# Patient Record
Sex: FEMALE | URBAN - METROPOLITAN AREA
[De-identification: names, ages, dates, MRNs, and addresses within clinical notes are randomized per-mention and may not be internally consistent; named-entity substitution may affect disease eponyms.]

---

## 2022-10-25 ENCOUNTER — EMPLOYEE HEALTH (OUTPATIENT)
Dept: OTHER | Facility: HOSPITAL | Age: 27
End: 2022-10-25
Attending: PREVENTIVE MEDICINE

## 2022-10-25 DIAGNOSIS — Z11.1 SCREENING-PULMONARY TB: Primary | ICD-10-CM

## 2022-10-25 PROCEDURE — 86480 TB TEST CELL IMMUN MEASURE: CPT

## 2022-10-27 LAB
M TB IFN-G CD4+ T-CELLS BLD-ACNC: 0.01 IU/ML
M TB TUBERC IFN-G BLD QL: NEGATIVE
M TB TUBERC IGNF/MITOGEN IGNF CONTROL: >10 IU/ML
QFT TB1 AG MINUS NIL: 0 IU/ML
QFT TB2 AG MINUS NIL: 0 IU/ML

## 2023-02-17 ENCOUNTER — OFFICE VISIT (OUTPATIENT)
Dept: FAMILY MEDICINE CLINIC | Facility: CLINIC | Age: 28
End: 2023-02-17
Payer: COMMERCIAL

## 2023-02-17 VITALS
SYSTOLIC BLOOD PRESSURE: 112 MMHG | DIASTOLIC BLOOD PRESSURE: 76 MMHG | RESPIRATION RATE: 16 BRPM | WEIGHT: 179.63 LBS | HEART RATE: 62 BPM | BODY MASS INDEX: 26.61 KG/M2 | TEMPERATURE: 99 F | HEIGHT: 69 IN | OXYGEN SATURATION: 99 %

## 2023-02-17 DIAGNOSIS — J06.9 UPPER RESPIRATORY INFECTION, ACUTE: Primary | ICD-10-CM

## 2023-02-17 DIAGNOSIS — J01.90 ACUTE RHINOSINUSITIS: ICD-10-CM

## 2023-02-17 LAB
OPERATOR ID: NORMAL
POCT LOT NUMBER: NORMAL
RAPID SARS-COV-2 BY PCR: NOT DETECTED

## 2023-02-17 PROCEDURE — 3078F DIAST BP <80 MM HG: CPT | Performed by: PHYSICIAN ASSISTANT

## 2023-02-17 PROCEDURE — U0002 COVID-19 LAB TEST NON-CDC: HCPCS | Performed by: PHYSICIAN ASSISTANT

## 2023-02-17 PROCEDURE — 99202 OFFICE O/P NEW SF 15 MIN: CPT | Performed by: PHYSICIAN ASSISTANT

## 2023-02-17 PROCEDURE — 3074F SYST BP LT 130 MM HG: CPT | Performed by: PHYSICIAN ASSISTANT

## 2023-02-17 PROCEDURE — 3008F BODY MASS INDEX DOCD: CPT | Performed by: PHYSICIAN ASSISTANT

## 2023-02-17 RX ORDER — AMOXICILLIN AND CLAVULANATE POTASSIUM 875; 125 MG/1; MG/1
1 TABLET, FILM COATED ORAL 2 TIMES DAILY
Qty: 10 TABLET | Refills: 0 | Status: SHIPPED | OUTPATIENT
Start: 2023-02-17 | End: 2023-02-22

## 2023-02-22 ENCOUNTER — OFFICE VISIT (OUTPATIENT)
Dept: FAMILY MEDICINE CLINIC | Facility: CLINIC | Age: 28
End: 2023-02-22

## 2023-02-22 VITALS
HEIGHT: 69 IN | BODY MASS INDEX: 25.92 KG/M2 | SYSTOLIC BLOOD PRESSURE: 114 MMHG | TEMPERATURE: 97 F | HEART RATE: 67 BPM | DIASTOLIC BLOOD PRESSURE: 71 MMHG | WEIGHT: 175 LBS

## 2023-02-22 DIAGNOSIS — J06.9 URI WITH COUGH AND CONGESTION: Primary | ICD-10-CM

## 2023-02-22 PROCEDURE — 99213 OFFICE O/P EST LOW 20 MIN: CPT | Performed by: NURSE PRACTITIONER

## 2023-02-22 PROCEDURE — 3074F SYST BP LT 130 MM HG: CPT | Performed by: NURSE PRACTITIONER

## 2023-02-22 PROCEDURE — 3078F DIAST BP <80 MM HG: CPT | Performed by: NURSE PRACTITIONER

## 2023-02-22 PROCEDURE — 3008F BODY MASS INDEX DOCD: CPT | Performed by: NURSE PRACTITIONER

## 2023-02-22 RX ORDER — PREDNISONE 20 MG/1
TABLET ORAL
Qty: 10 TABLET | Refills: 0 | Status: SHIPPED | OUTPATIENT
Start: 2023-02-22

## 2023-02-22 RX ORDER — NORGESTIMATE AND ETHINYL ESTRADIOL 7DAYSX3 28
KIT ORAL
COMMUNITY

## 2023-02-22 RX ORDER — ALBUTEROL SULFATE 90 UG/1
2 AEROSOL, METERED RESPIRATORY (INHALATION) EVERY 4 HOURS PRN
Qty: 18 G | Refills: 0 | Status: SHIPPED | OUTPATIENT
Start: 2023-02-22

## 2023-02-22 RX ORDER — BENZONATATE 200 MG/1
200 CAPSULE ORAL 3 TIMES DAILY PRN
Qty: 30 CAPSULE | Refills: 0 | Status: SHIPPED | OUTPATIENT
Start: 2023-02-22

## 2023-03-13 ENCOUNTER — PATIENT MESSAGE (OUTPATIENT)
Dept: FAMILY MEDICINE CLINIC | Facility: CLINIC | Age: 28
End: 2023-03-13

## 2023-03-13 ENCOUNTER — OFFICE VISIT (OUTPATIENT)
Dept: FAMILY MEDICINE CLINIC | Facility: CLINIC | Age: 28
End: 2023-03-13
Payer: COMMERCIAL

## 2023-03-13 VITALS
HEIGHT: 69 IN | HEART RATE: 81 BPM | SYSTOLIC BLOOD PRESSURE: 129 MMHG | DIASTOLIC BLOOD PRESSURE: 85 MMHG | WEIGHT: 175 LBS | BODY MASS INDEX: 25.92 KG/M2 | TEMPERATURE: 99 F | OXYGEN SATURATION: 99 % | RESPIRATION RATE: 16 BRPM

## 2023-03-13 DIAGNOSIS — R05.1 ACUTE COUGH: Primary | ICD-10-CM

## 2023-03-13 DIAGNOSIS — U07.1 COVID-19: ICD-10-CM

## 2023-03-13 LAB
OPERATOR ID: ABNORMAL
POCT LOT NUMBER: ABNORMAL
RAPID SARS-COV-2 BY PCR: DETECTED

## 2023-03-14 ENCOUNTER — TELEPHONE (OUTPATIENT)
Dept: INTERNAL MEDICINE CLINIC | Facility: HOSPITAL | Age: 28
End: 2023-03-14

## 2023-03-14 NOTE — TELEPHONE ENCOUNTER
From: Claudine Jose  To: RHIANNA Bobby  Sent: 3/13/2023 4:40 PM CDT  Subject: [facility], I recently saw you a few weeks ago for symptoms of a viral infection. I woke up this morning with all of the same symptoms that I previously had. I took an at-home Covid test and it came back positive. Do I need to get an order from you to get tested at the immediate care center?  Thank you

## 2023-03-21 ENCOUNTER — OFFICE VISIT (OUTPATIENT)
Dept: OBGYN CLINIC | Facility: CLINIC | Age: 28
End: 2023-03-21

## 2023-03-21 VITALS
WEIGHT: 175 LBS | SYSTOLIC BLOOD PRESSURE: 128 MMHG | DIASTOLIC BLOOD PRESSURE: 83 MMHG | BODY MASS INDEX: 26 KG/M2 | HEART RATE: 59 BPM

## 2023-03-21 DIAGNOSIS — Z30.41 ENCOUNTER FOR SURVEILLANCE OF CONTRACEPTIVE PILLS: Primary | ICD-10-CM

## 2023-03-21 PROCEDURE — 3074F SYST BP LT 130 MM HG: CPT | Performed by: OBSTETRICS & GYNECOLOGY

## 2023-03-21 PROCEDURE — 99203 OFFICE O/P NEW LOW 30 MIN: CPT | Performed by: OBSTETRICS & GYNECOLOGY

## 2023-03-21 PROCEDURE — 3079F DIAST BP 80-89 MM HG: CPT | Performed by: OBSTETRICS & GYNECOLOGY

## 2023-03-21 RX ORDER — NORGESTIMATE AND ETHINYL ESTRADIOL 7DAYSX3 28
1 KIT ORAL DAILY
Qty: 84 TABLET | Refills: 3 | Status: SHIPPED | OUTPATIENT
Start: 2023-03-21

## 2023-07-27 ENCOUNTER — TELEPHONE (OUTPATIENT)
Dept: INTERNAL MEDICINE CLINIC | Facility: HOSPITAL | Age: 28
End: 2023-07-27

## 2023-07-27 DIAGNOSIS — Z20.822 EXPOSURE TO COVID-19 VIRUS: Primary | ICD-10-CM

## 2023-07-27 NOTE — TELEPHONE ENCOUNTER
[x] [address] [facility]  []GLO   [] [facility]  Manager :Katlyn Xiao    HAVE YOU RECEIVED THE COVID-19 Vaccine? Yes [x]    No []          If yes, date(s) received: 9/16/21;10/8/21            Which vaccine:  Pfizer [x]     Radhaino Left []    J&J []      SYMPTOMS (reported via dashboard):  [x] asymptomatic  [] symptomatic  [] GI symptoms only    Symptom onset date: n/a  Fever   > 100F             Yes []      Cough                          Yes []      Shortness of breath  Yes []      Congestion                 Yes []      Runny nose                Yes []        Loss of Smell              Yes []        Loss of Taste             Yes []       Sore throat                 Yes []       Fatigue                        Yes []       Body Aches                Yes []        Chills                           Yes []        Headache                   Yes []             GI symptoms             Yes []     No [x]                     Nausea   []          Vomiting            []                                    Diarrhea  []          Upset stomach []      Employee has positive COVID Exposure? Yes [x]     No []    Date of exposure:  7/23/34   []  Coworker                       [x] patient                        [] Family/friend    Employee has a history of Covid?   Yes [x]     No []   If Yes, when: 3/15/23    When was the last shift you worked?:7/24/23      PLAN:     COVID-19 testing ordered: [] Rapid    [x] Alinity              Date test is to be taken:   7/27/23    []  Outside testing                           Notes:    INSTRUCTIONS PROVIDED:    [x]  Employee was instructed to call Central scheduling at [phone] or use [employer] to make an appointment for their testing and once at the site remain in their vehicle and call [phone] to register for the appointment  []  May return to work if employee views negative result in [address] and remains fever, vomiting, and diarrhea free  [x]  May continue to work if remains asymptomatic and views negative result in [facility]  []  Follow up for condition update when resulting  []  If symptoms develop, stay home and call hotline for rapid test order  [x]  If COVID positive results, off work minimum of 5 days from positive test or onset of symptoms (day 0)    []      On day 5, if asymptomatic or mildly symptomatic (with improving symptoms) may return to work day 6  []      On day 5, if symptomatic, call Employee Health for RTW screening        [x]  Plan noted above  []  Length of time to obtain results  []  Quarantine instructions  []  S/S of worsening infection/condition and importance of prompt medical re-evaluation including when to seek emergency care.    [] The employee voiced understanding

## 2023-07-30 ENCOUNTER — LAB ENCOUNTER (OUTPATIENT)
Dept: LAB | Facility: HOSPITAL | Age: 28
End: 2023-07-30
Attending: PREVENTIVE MEDICINE
Payer: COMMERCIAL

## 2023-07-30 DIAGNOSIS — Z20.822 EXPOSURE TO COVID-19 VIRUS: ICD-10-CM

## 2023-07-30 PROCEDURE — 87635 SARS-COV-2 COVID-19 AMP PRB: CPT

## 2023-07-31 LAB — SARS-COV-2 RNA RESP QL NAA+PROBE: NOT DETECTED

## 2023-08-01 NOTE — TELEPHONE ENCOUNTER
Results and RTW guidelines:    COVID RESULT:    [x] Viewed by employee in [address] RTW plan and instructions as indicated on triage call. Manager notified. Estimated RTW date:   [] Discussed with employee   [] Unable to reach by phone. Sent via [employer] message      Test type:    [] Rapid         [x] Alinity         [] Outside test:       [x] NEGATIVE     Ordered Alinity retest?  []Yes   [x] No (skip to RTW)   Ordered Rapid retest?   []Yes   [x] No (skip to RTW)           Dated to be taken:      If Yes, PLACE ORDER NOW and instruct the following:  -Originally Symptomatic or Now Symptoms:   -RTW when sx improve- fever free for 24 hours w/o medications, Diarrhea/Vomiting for 24 hours w/o medications    -Originally  Asymptomatic  -Asymptomatic AND Vaccinated or Unvaccinated or Prior infection in past 90 days:   -May work and continue to monitor symptoms for the next 14 days.                                         -Rapid test day 2, rapid test day 5 (day 0 - exposure)    [] Positive     - Employee should quarantine at home for at least 5 days (day 1 is day after sx onset) , follow the CDC guidelines for cleaning and                              quarantining; see CDC.gov   -This employee may RTW on day 6 if asymptomatic or mildly symptomatic (with improving symptoms). Call Employee Health on day 5 if unable to return on day 6 after                      symptom onset.    -This employee needs to call Employee Health on day 5 after symptom onset. The employee needs to be cleared by Employee Health. - If employee is still experiencing severe symptoms on day 5 must make a RTW appt with Employee Health, Employee will not be cleared if:    1. Has consistent cough, shortness of breath or fatigue that restricts your physical activities    2. Is still feeling \"unwell\"    3. Within 15 days of hospitalization for COVID    4. Within 20 days of intubation for COVID    5.  Still has a fever, vomiting or diarrhea   - Keep communication open with management about RTW and if symptoms worsen  - Monitor symptoms and temperature                 - Notify PCP of result                 - Seek emergent care with worsening symptoms                - If outside testing completed, bring a copy of result to RTW appointment           Notes:     RTW PLAN:    []  If COVID positive results, off work minimum of 5 days from positive test or onset of symptoms (day 0)        On day 5, if asymptomatic or mildly symptomatic (with improving symptoms) may return to work day 6          On day 5, if symptomatic, call Employee Health for RTW screening        []  COVID positive result - call Employee Health on day 5 after symptom onset. The employee needs to be cleared by Employee Health to RTW. [] RTW immediately, continue to monitor for sx  [] RTW when sx improve; must be fever free for 24 hours w/o medications, Diarrhea/Vomiting free for 24 hours w/o medications  [] Alinity ordered; continue to monitor sx and call for new/worsening sx.   Discuss RTW guidelines with manager  [x] May continue to work  [] Follow up with PCP  [] Home until further instruction from hotline with Alinity results  INSTRUCTIONS PROVIDED:  [x]  Plan as noted above  []  Length of time to obtain results   []  Quarantine instructions  []  Masking protocol   []  S/S of worsening infection/condition and importance of prompt medical re-evaluation including when to seek emergency care  [] If symptoms develop, stay home and call hotline for rapid test order    Estimated RTW date:      [] The employee voiced understanding of above plan/instructions  [x] Manager Notified

## 2023-09-01 ENCOUNTER — OFFICE VISIT (OUTPATIENT)
Dept: OBGYN CLINIC | Facility: CLINIC | Age: 28
End: 2023-09-01

## 2023-09-01 VITALS
HEART RATE: 64 BPM | WEIGHT: 185.81 LBS | BODY MASS INDEX: 27 KG/M2 | SYSTOLIC BLOOD PRESSURE: 131 MMHG | DIASTOLIC BLOOD PRESSURE: 82 MMHG

## 2023-09-01 DIAGNOSIS — Z01.419 WELL WOMAN EXAM WITH ROUTINE GYNECOLOGICAL EXAM: Primary | ICD-10-CM

## 2023-09-01 PROCEDURE — 3079F DIAST BP 80-89 MM HG: CPT | Performed by: OBSTETRICS & GYNECOLOGY

## 2023-09-01 PROCEDURE — 3075F SYST BP GE 130 - 139MM HG: CPT | Performed by: OBSTETRICS & GYNECOLOGY

## 2023-09-01 PROCEDURE — 99395 PREV VISIT EST AGE 18-39: CPT | Performed by: OBSTETRICS & GYNECOLOGY

## 2023-09-01 RX ORDER — NORGESTIMATE AND ETHINYL ESTRADIOL 7DAYSX3 28
1 KIT ORAL DAILY
Qty: 84 TABLET | Refills: 3 | Status: SHIPPED | OUTPATIENT
Start: 2023-09-01

## 2024-02-16 ENCOUNTER — OFFICE VISIT (OUTPATIENT)
Dept: FAMILY MEDICINE CLINIC | Facility: CLINIC | Age: 29
End: 2024-02-16
Payer: COMMERCIAL

## 2024-02-16 VITALS
WEIGHT: 165 LBS | HEART RATE: 64 BPM | BODY MASS INDEX: 24.44 KG/M2 | SYSTOLIC BLOOD PRESSURE: 120 MMHG | DIASTOLIC BLOOD PRESSURE: 80 MMHG | HEIGHT: 69 IN

## 2024-02-16 DIAGNOSIS — Z00.00 WELL ADULT EXAM: Primary | ICD-10-CM

## 2024-02-16 DIAGNOSIS — F41.9 ANXIETY AND DEPRESSION: ICD-10-CM

## 2024-02-16 DIAGNOSIS — F32.A ANXIETY AND DEPRESSION: ICD-10-CM

## 2024-02-16 NOTE — PROGRESS NOTES
HPI    Patient presents for annual physical.  Negative for past medical history.  Positive/negative for complaints of health.  Is having depressed and anxious moods lately.  Going through a difficult breakup.      Gyne history - G0.  Last pap - 6/12/2022, normal.    LMP - 2/1/2024.    Diet - diet is healthy.  Exercise - exercises regularly.    Immunizations - up to date.    Review of Systems   Constitutional:  Negative for activity change, appetite change, fatigue and unexpected weight change.   HENT:  Negative for congestion, ear pain, rhinorrhea, sore throat and tinnitus.    Eyes:  Negative for pain, discharge and itching.   Respiratory:  Negative for cough, shortness of breath and wheezing.    Cardiovascular:  Negative for chest pain.   Gastrointestinal:  Negative for abdominal distention, abdominal pain, constipation, diarrhea, nausea and vomiting.   Musculoskeletal:  Negative for gait problem and joint swelling.   Hematological:  Negative for adenopathy. Does not bruise/bleed easily.   Psychiatric/Behavioral:  Negative for behavioral problems and confusion. The patient is nervous/anxious.         Depressed mood.    All other systems reviewed and are negative.       Vitals:    02/16/24 1433   BP: 120/80   Pulse: 64   Weight: 165 lb (74.8 kg)   Height: 5' 9\" (1.753 m)     Body mass index is 24.37 kg/m².    Health Maintenance   Topic Date Due    Annual Physical  Never done    DTaP,Tdap,and Td Vaccines (1 - Tdap) Never done    Pap Smear  Never done    COVID-19 Vaccine (3 - 2023-24 season) 09/01/2023    Influenza Vaccine (1) 10/01/2023    Annual Depression Screening  01/01/2024    Pneumococcal Vaccine: Birth to 64yrs  Aged Out       Patient's last menstrual period was 02/01/2024 (approximate).    History reviewed. No pertinent past medical history.    .  Past Surgical History:   Procedure Laterality Date    Other surgical history  12/2013    mass removal on RT breast       Family History   Problem Relation Age of  Onset    Hypertension Father     Hypertension Mother        Social History     Socioeconomic History    Marital status: Single     Spouse name: Not on file    Number of children: Not on file    Years of education: Not on file    Highest education level: Not on file   Occupational History    Not on file   Tobacco Use    Smoking status: Never    Smokeless tobacco: Never   Vaping Use    Vaping Use: Never used   Substance and Sexual Activity    Alcohol use: Yes     Comment: socially    Drug use: Never    Sexual activity: Yes   Other Topics Concern    Not on file   Social History Narrative    Not on file     Social Determinants of Health     Financial Resource Strain: Not on file   Food Insecurity: Not on file   Transportation Needs: Not on file   Physical Activity: Not on file   Stress: Not on file   Social Connections: Not on file   Housing Stability: Not on file       Current Outpatient Medications   Medication Sig Dispense Refill    sertraline 50 MG Oral Tab Take 1 tablet (50 mg total) by mouth daily. 30 tablet 0       Allergies:  No Known Allergies    Physical Exam  Vitals and nursing note reviewed.   Constitutional:       General: She is not in acute distress.     Appearance: Normal appearance. She is well-developed. She is not ill-appearing, toxic-appearing or diaphoretic.   HENT:      Head: Normocephalic and atraumatic.      Right Ear: Hearing, tympanic membrane, ear canal and external ear normal. There is no impacted cerumen.      Left Ear: Hearing, tympanic membrane, ear canal and external ear normal. There is no impacted cerumen.      Nose: Nose normal. No congestion.      Mouth/Throat:      Mouth: Mucous membranes are moist.      Pharynx: Oropharynx is clear. No oropharyngeal exudate or posterior oropharyngeal erythema.   Eyes:      General:         Right eye: No discharge.         Left eye: No discharge.      Conjunctiva/sclera: Conjunctivae normal.   Neck:      Thyroid: No thyromegaly.   Cardiovascular:       Rate and Rhythm: Normal rate and regular rhythm.      Pulses: Normal pulses.      Heart sounds: Normal heart sounds. No murmur heard.  Pulmonary:      Effort: Pulmonary effort is normal. No respiratory distress.      Breath sounds: Normal breath sounds. No stridor. No wheezing, rhonchi or rales.   Chest:      Chest wall: No tenderness.   Abdominal:      General: Abdomen is flat. Bowel sounds are normal. There is no distension.      Palpations: Abdomen is soft. There is no mass.      Tenderness: There is no abdominal tenderness. There is no guarding or rebound.      Hernia: No hernia is present.   Musculoskeletal:         General: Normal range of motion.      Cervical back: Normal range of motion and neck supple.   Lymphadenopathy:      Cervical: No cervical adenopathy.   Skin:     General: Skin is warm and dry.   Neurological:      Mental Status: She is alert and oriented to person, place, and time.   Psychiatric:         Mood and Affect: Mood normal. Affect is tearful.         Behavior: Behavior normal.         Thought Content: Thought content normal.         Judgment: Judgment normal.          Assessment and Plan:  Problem List Items Addressed This Visit    None  Visit Diagnoses       Well adult exam    -  Primary    Relevant Medications    sertraline 50 MG Oral Tab    Other Relevant Orders    Lipid Panel    Comp Metabolic Panel (14)    CBC With Differential With Platelet    TSH W Reflex To Free T4    Vitamin D    Vitamin B12    Anxiety and depression        Relevant Medications    sertraline 50 MG Oral Tab           Follow up for fasting labs.  To start sertraline daily.  Follow up in 3 weeks.       Discussed plan of care with patient and patient is in agreement.  All questions answered. Patient to call with questions or concerns.    Encouraged to sign up for My Chart if not already registered.

## 2024-02-18 ENCOUNTER — LAB ENCOUNTER (OUTPATIENT)
Dept: LAB | Facility: HOSPITAL | Age: 29
End: 2024-02-18
Attending: NURSE PRACTITIONER
Payer: COMMERCIAL

## 2024-02-18 DIAGNOSIS — Z00.00 WELL ADULT EXAM: ICD-10-CM

## 2024-02-18 LAB
ALBUMIN SERPL-MCNC: 4.5 G/DL (ref 3.4–5)
ALBUMIN/GLOB SERPL: 1.4 {RATIO} (ref 1–2)
ALP LIVER SERPL-CCNC: 57 U/L
ALT SERPL-CCNC: 20 U/L
ANION GAP SERPL CALC-SCNC: 6 MMOL/L (ref 0–18)
AST SERPL-CCNC: 10 U/L (ref 15–37)
BASOPHILS # BLD AUTO: 0.04 X10(3) UL (ref 0–0.2)
BASOPHILS NFR BLD AUTO: 0.6 %
BILIRUB SERPL-MCNC: 0.7 MG/DL (ref 0.1–2)
BUN BLD-MCNC: 14 MG/DL (ref 9–23)
CALCIUM BLD-MCNC: 9.3 MG/DL (ref 8.5–10.1)
CHLORIDE SERPL-SCNC: 107 MMOL/L (ref 98–112)
CHOLEST SERPL-MCNC: 146 MG/DL (ref ?–200)
CO2 SERPL-SCNC: 24 MMOL/L (ref 21–32)
CREAT BLD-MCNC: 0.8 MG/DL
EGFRCR SERPLBLD CKD-EPI 2021: 104 ML/MIN/1.73M2 (ref 60–?)
EOSINOPHIL # BLD AUTO: 0.07 X10(3) UL (ref 0–0.7)
EOSINOPHIL NFR BLD AUTO: 1.1 %
ERYTHROCYTE [DISTWIDTH] IN BLOOD BY AUTOMATED COUNT: 13.6 %
FASTING PATIENT LIPID ANSWER: YES
FASTING STATUS PATIENT QL REPORTED: YES
GLOBULIN PLAS-MCNC: 3.3 G/DL (ref 2.8–4.4)
GLUCOSE BLD-MCNC: 87 MG/DL (ref 70–99)
HCT VFR BLD AUTO: 40.8 %
HDLC SERPL-MCNC: 58 MG/DL (ref 40–59)
HGB BLD-MCNC: 12.9 G/DL
IMM GRANULOCYTES # BLD AUTO: 0.01 X10(3) UL (ref 0–1)
IMM GRANULOCYTES NFR BLD: 0.2 %
LDLC SERPL CALC-MCNC: 77 MG/DL (ref ?–100)
LYMPHOCYTES # BLD AUTO: 2.44 X10(3) UL (ref 1–4)
LYMPHOCYTES NFR BLD AUTO: 36.6 %
MCH RBC QN AUTO: 26.5 PG (ref 26–34)
MCHC RBC AUTO-ENTMCNC: 31.6 G/DL (ref 31–37)
MCV RBC AUTO: 84 FL
MONOCYTES # BLD AUTO: 0.56 X10(3) UL (ref 0.1–1)
MONOCYTES NFR BLD AUTO: 8.4 %
NEUTROPHILS # BLD AUTO: 3.54 X10 (3) UL (ref 1.5–7.7)
NEUTROPHILS # BLD AUTO: 3.54 X10(3) UL (ref 1.5–7.7)
NEUTROPHILS NFR BLD AUTO: 53.1 %
NONHDLC SERPL-MCNC: 88 MG/DL (ref ?–130)
OSMOLALITY SERPL CALC.SUM OF ELEC: 284 MOSM/KG (ref 275–295)
PLATELET # BLD AUTO: 359 10(3)UL (ref 150–450)
POTASSIUM SERPL-SCNC: 3.7 MMOL/L (ref 3.5–5.1)
PROT SERPL-MCNC: 7.8 G/DL (ref 6.4–8.2)
RBC # BLD AUTO: 4.86 X10(6)UL
SODIUM SERPL-SCNC: 137 MMOL/L (ref 136–145)
TRIGL SERPL-MCNC: 52 MG/DL (ref 30–149)
TSI SER-ACNC: 1.83 MIU/ML (ref 0.36–3.74)
VIT B12 SERPL-MCNC: 838 PG/ML (ref 193–986)
VIT D+METAB SERPL-MCNC: 9.1 NG/ML (ref 30–100)
VLDLC SERPL CALC-MCNC: 8 MG/DL (ref 0–30)
WBC # BLD AUTO: 6.7 X10(3) UL (ref 4–11)

## 2024-02-18 PROCEDURE — 80061 LIPID PANEL: CPT

## 2024-02-18 PROCEDURE — 82607 VITAMIN B-12: CPT

## 2024-02-18 PROCEDURE — 80053 COMPREHEN METABOLIC PANEL: CPT

## 2024-02-18 PROCEDURE — 82306 VITAMIN D 25 HYDROXY: CPT

## 2024-02-18 PROCEDURE — 36415 COLL VENOUS BLD VENIPUNCTURE: CPT

## 2024-02-18 PROCEDURE — 84443 ASSAY THYROID STIM HORMONE: CPT

## 2024-02-18 PROCEDURE — 85025 COMPLETE CBC W/AUTO DIFF WBC: CPT

## 2024-02-20 DIAGNOSIS — E55.9 VITAMIN D DEFICIENCY: Primary | ICD-10-CM

## 2024-02-20 RX ORDER — ERGOCALCIFEROL 1.25 MG/1
50000 CAPSULE ORAL WEEKLY
Qty: 12 CAPSULE | Refills: 3 | Status: SHIPPED | OUTPATIENT
Start: 2024-02-20

## 2024-03-12 DIAGNOSIS — E55.9 VITAMIN D DEFICIENCY: ICD-10-CM

## 2024-03-14 RX ORDER — ERGOCALCIFEROL 1.25 MG/1
50000 CAPSULE ORAL WEEKLY
Qty: 12 CAPSULE | Refills: 3 | OUTPATIENT
Start: 2024-03-14

## 2024-07-01 ENCOUNTER — OFFICE VISIT (OUTPATIENT)
Dept: OBGYN CLINIC | Facility: CLINIC | Age: 29
End: 2024-07-01
Payer: COMMERCIAL

## 2024-07-01 VITALS
DIASTOLIC BLOOD PRESSURE: 76 MMHG | SYSTOLIC BLOOD PRESSURE: 121 MMHG | HEART RATE: 45 BPM | WEIGHT: 168.81 LBS | BODY MASS INDEX: 25 KG/M2

## 2024-07-01 DIAGNOSIS — Z30.09 GENERAL COUNSELING AND ADVICE FOR CONTRACEPTIVE MANAGEMENT: Primary | ICD-10-CM

## 2024-07-01 RX ORDER — NORGESTIMATE AND ETHINYL ESTRADIOL 7DAYSX3 28
1 KIT ORAL DAILY
Qty: 84 TABLET | Refills: 3 | Status: SHIPPED | OUTPATIENT
Start: 2024-07-01 | End: 2025-07-01

## 2024-07-01 NOTE — PATIENT INSTRUCTIONS
FACT SHEET: THE PILL               HOW DO BIRTH CONTROL PILLS WORK?   Birth control pills contain hormones like the ones your body makes.  These hormones stop your ovaries from releasing eggs.  Without an egg, you cannot get pregnant.   No method of birth control is 100% effective, but birth control pills are 92-99% effective if you take them each day.    HOW DO I START THE PILL?   There are 2 ways to start the pill:    Quick Start: Take your first pill as soon as you get the pack.    Next period: Take your first pill soon after your next period begins.    If you take your first pill up to 5 days after the start of your period, you are protected against pregnancy right away.    If you take your first pill more than 5 days after the start of your period, you should use condoms as back-up for the first 7 days.    HOW DO I USE THE PILL?   Once you start using the pill, take 1 pill each day. Take your pill at the same time each day.   After you finish a pack of pills, you should start a new pack the next day. You should have NO day without a pill.    WHAT IF I MISS PILLS?   I forgot ONE pill:  Take your pill as soon as you can.   I forgot TWO pills or more: Take your pill as soon as you can. Take your next pill at the usual time. Use condoms for 7 days. Use emergency contraception (EC) if you have unprotected sex.    WHAT IF I STOPPED TAKING THE PILL AND HAD UNPROTECTED SEX?    Take Emergency Contraception (EC) right away.  EC can prevent pregnancy up to 5 days after sex, and it works better the sooner you take it.    HOW DOES THE PILL HELP ME?   The pill is safe and effective birth control.    Your periods may be more regular, lighter, and shorter. You may have clearer skin.    The pill lowers your risk of getting cancer of the uterus and ovaries.     The pill has no effect on your ability to get pregnant in the future, after you stop taking it.      HOW WILL I FEEL ON THE PILL?   You will feel about the same.  In the  first 2-3 months you may have nausea, bleeding between periods, weight change, and/or breast pain. These problems often go away after 2-3 months.    DOES THE PILL HAVE RISKS?   The pill is very safe. Serious problems are rare. If you have any of the symptoms below, call your health provider.   Leg pain, swelling, and redness   Weakness or numbness on 1 side of your body   Bad headache   Vision problems   Chest pain   Your health provider can help you find out if these symptoms are signs of a serious problem.    **Remember, the pill does not protect you from Sexually Transmitted Infections or HIV. Always use condoms to protect yourself!**    Www.reproductiveaccess.org

## 2024-07-01 NOTE — PROGRESS NOTES
[facility] [address]   Obstetrics and Gynecology    Catie Perez is a 29 year old female  Patient's last menstrual period was 2024 (exact date).   Chief Complaint   Patient presents with    Consult     Discuss IUD insertion for birth control   .   Last seen 2023 with Dr Cordova. She stopped her ocps in January. She was on for 5 years, didn't like mood changes on ocps. Stopped ocps due to no longer with prior partner.  Has noticed increase in acne since stopping ocps    Regular periods since stopping ocps. Menses will last about 4 days, normal to light flow, no pain with periods.    Now sexually active with new partner, using condoms.    Pap:2022 NILM, negative human papillomavirus   NILM      OBSTETRICS HISTORY:  OB History    Para Term  AB Living   0 0 0 0 0 0   SAB IAB Ectopic Multiple Live Births   0 0 0 0 0       GYNE HISTORY:  Menarche: 13-15 (2023  9:45 AM)  Period Cycle (Days): MONTHLY (2023  9:45 AM)  Period Duration (Days): 5-6 days (2023  9:45 AM)  Period Flow: Moderate (2023  9:45 AM)  Use of Birth Control (if yes, specify type): OCP (2023  9:45 AM)  Date When Birth Control Last Used: 3/21/23 OCP (2023  9:45 AM)  Pap Date: 22 (2023  9:45 AM)  Pap Result Notes: Neg Pap/HPV (2023  9:45 AM)      History   Sexual Activity    Sexual activity: Yes       MEDICAL HISTORY:  History reviewed. No pertinent past medical history.    SOCIAL HISTORY:  Social History     Socioeconomic History    Marital status: Single     Spouse name: Not on file    Number of children: Not on file    Years of education: Not on file    Highest education level: Not on file   Occupational History    Not on file   Tobacco Use    Smoking status: Never    Smokeless tobacco: Never   Vaping Use    Vaping status: Never Used   Substance and Sexual Activity    Alcohol use: Yes     Comment: socially    Drug use: Never    Sexual activity: Yes   Other Topics Concern    Not on  file   Social History Narrative    Not on file     Social Determinants of Health     Financial Resource Strain: Not on file   Food Insecurity: Not on file   Transportation Needs: Not on file   Physical Activity: Not on file   Stress: Not on file   Social Connections: Not on file   Housing Stability: Not on file       MEDICATIONS:    Current Outpatient Medications:     Norgestim-Eth Estrad Triphasic (TRI-LINYAH) 0.18/0.215/0.25 MG-35 MCG Oral Tab, Take 1 tablet by mouth daily., Disp: 84 tablet, Rfl: 3    sertraline 50 MG Oral Tab, Take 1 tablet (50 mg total) by mouth daily., Disp: 90 tablet, Rfl: 1    ergocalciferol 1.25 MG (47013 UT) Oral Cap, Take 1 capsule (50,000 Units total) by mouth once a week., Disp: 12 capsule, Rfl: 3    ALLERGIES:  No Known Allergies      Review of Systems:  Constitutional:  Denies fatigue, night sweats, hot flashes  Cardiovascular:  denies chest pain or palpitations  Respiratory:  denies shortness of breath  Gastrointestinal:  denies heartburn, abdominal pain, diarrhea or constipation  Genitourinary:  denies dysuria, incontinence, abnormal vaginal discharge, vaginal itching   Musculoskeletal:  denies back pain.  Skin/Breast:  Denies any breast pain, lumps, or discharge.   Neurological:  denies headaches, extremity weakness or numbness.  Psychiatric: denies depression or anxiety.  Endocrine:   denies excessive thirst or urination.  Heme/Lymph:  denies history of anemia, easy bruising or bleeding.      PHYSICAL EXAM:     Vitals:    07/01/24 1049   BP: 121/76   Pulse: (!) 45   Weight: 168 lb 12.8 oz (76.6 kg)     Body mass index is 24.93 kg/m².     Constitutional: well developed, well nourished    Psychiatric:  Oriented to time, place, person and situation. Appropriate mood and affect    Assessment & Plan:  Catie was seen today for consult.    Diagnoses and all orders for this visit:    General counseling and advice for contraceptive management    Other orders  -     Norgestim-Eth Estrad  Triphasic (TRI-LINYAH) 0.18/0.215/0.25 MG-35 MCG Oral Tab; Take 1 tablet by mouth daily.    Discussed all IUDs in detail. Paragard, and progestin IUD (kyleena, abdulkadir and mirena). Reviewed risks, benefits and SE with IUDs. Reviewed progestin IUD may worsen acne.    After further discussion - patient desires to restart ocps. Reviewed quick start vs. Starting with last menstrual period. Reviewed risks and benefits of oral contraceptives. Reviewed to call or go to ER if increased headaches, SOB, CP or leg pain with or without swelling.  Reviewed when to start OCPs, how to take OCPs, and when to use back up contraception  Patient verbalized understanding        RHIANNA Cramer    This note was prepared using Dragon Medical voice recognition dictation software. As a result errors may occur. When identified these errors have been corrected. While every attempt is made to correct errors during dictation discrepancies may still exist.

## 2024-09-03 DIAGNOSIS — F41.9 ANXIETY AND DEPRESSION: ICD-10-CM

## 2024-09-03 DIAGNOSIS — F32.A ANXIETY AND DEPRESSION: ICD-10-CM

## 2024-09-06 NOTE — TELEPHONE ENCOUNTER
Refill Per Protocol     Requested Prescriptions   Pending Prescriptions Disp Refills    SERTRALINE 50 MG Oral Tab [Pharmacy Med Name: Sertraline Hydrochloride 50 Mg Tab Nort] 90 tablet 0     Sig: Take 1 tablet (50 mg total) by mouth daily.       Psychiatric Non-Scheduled (Anti-Anxiety) Passed - 9/3/2024  6:12 PM        Passed - In person appointment or virtual visit in the past 6 mos or appointment in next 3 mos     Recent Outpatient Visits              2 months ago [facility] and [facility] for contraceptive management    [facility]    [facility]    5 months [facility]    [facility]    6 months ago [facility] exam    [facility]    Office Visit    1 year ago Well woman exam with routine gynecological exam    [facility] [place] - OB/Rosa Damian MD    Office Visit    1 year ago Encounter for surveillance of contraceptive pills    [facility]    Office Visit                      Passed - Depression Screening completed within the past 12 months                 Recent Outpatient Visits              2 months ago General counseling and advice for contraceptive management    [facility]    Office Visit    5 months ago [facility]    [facility]    6 months ago [facility] adult [facility]    Office Visit    1 year ago Well woman exam with routine gynecological exam    [facility] [place] - OB/Rosa Damian MD    Office Visit    1  year ago Encounter for surveillance of contraceptive pills    [facility] [place] - OB/GYN Rosa Cordova MD    Office Visit

## 2024-11-15 NOTE — TELEPHONE ENCOUNTER
Please review. Protocol failed / No Protocol.    Requested Prescriptions   Pending Prescriptions Disp Refills    ergocalciferol 1.25 MG (25558 UT) Oral Cap 12 capsule 3     Sig: Take 1 capsule (50,000 Units total) by mouth once a week.       There is no refill protocol information for this order       sertraline 50 MG Oral Tab 90 tablet 3     Sig: Take 1 tablet (50 mg total) by mouth daily.       Psychiatric Non-Scheduled (Anti-Anxiety) Failed - 11/14/2024 10:26 PM        Failed - In person appointment or virtual visit in the past 6 mos or appointment in next 3 mos     Recent Outpatient Visits              4 months ago General counseling and advice for contraceptive management    [facility] [place] - OB/GYN Larissa Reilly APRN    Office Visit    8 months ago [facility]    [facility] Visit    9 months ago Well adult exam    [facility]    Office Visit    1 year ago Well woman exam with [facility] gynec[facility]    Office Visit    1 year ago Encounter for surveillance of contraceptive pills    [facility]    Office Visit                      Passed - Depression Screening completed within the past 12 months

## 2024-11-29 NOTE — PROGRESS NOTES
HPI    Virtual Telephone/Video Check-In    Catie Perez verbally consents to a Virtual/Telephone Check-In visit on 11/29/24.  Patient has been referred to the [facility] website at [email or web address] to review the yearly Consent to Treat document.    Patient understands and accepts financial responsibility for any deductible, co-insurance and/or co-pays associated with this service.    Duration of the service: 8 minutes      Summary of topics discussed:     Patient presents for follow up for anxiety.  Was previously taking sertraline 50 mg.  Would like to discontinue at this point.    Also with concerns of tachycardia in the mornings.  Heart rate in the 180-190s.  Was previously only happening from time to time but has started to become more frequent.  Started a few months ago.    Review of Systems   Cardiovascular:         Tachycardia   All other systems reviewed and are negative.       There were no vitals filed for this visit.  There is no height or weight on file to calculate BMI.    Health Maintenance   Topic Date Due    COVID-19 Vaccine (3 - 2024-25 season) 09/01/2024    Influenza Vaccine (1) 10/01/2024    Annual Physical  02/16/2025    Pap Smear  06/12/2025    DTaP,Tdap,and Td Vaccines (8 - Td or Tdap) 10/24/2026    Annual Depression Screening  Completed    Pneumococcal Vaccine: Birth to 64yrs  Aged Out       Patient's last menstrual period was 06/13/2024 (exact date).    No past medical history on file.    .  Past Surgical History:   Procedure Laterality Date    Other surgical history  12/2013    mass removal on RT breast       Family History   Problem Relation Age of Onset    Hypertension Father     Hypertension Mother        Social History     Socioeconomic History    Marital status: Single     Spouse name: Not on file    Number of children: Not on file    Years of education: Not on file    Highest education level: Not on file   Occupational History    Not on file   Tobacco Use    Smoking status:  Never    Smokeless tobacco: Never   Vaping Use    Vaping status: Never Used   Substance and Sexual Activity    Alcohol use: Yes     Comment: socially    Drug use: Never    Sexual activity: Yes   Other Topics Concern    Not on file   Social History Narrative    Not on file     Social Drivers of Health     Financial Resource Strain: Not on file   Food Insecurity: Not on file   Transportation Needs: Not on file   Physical Activity: Not on file   Stress: Not on file   Social Connections: Not on file   Housing Stability: Not on file       Current Outpatient Medications   Medication Sig Dispense Refill    sertraline 25 MG Oral Tab Take 1 tablet (25 mg total) by mouth daily. 30 tablet 0    Norgestim-Eth Estrad Triphasic (TRI-LINYAH) 0.18/0.215/0.25 MG-35 MCG Oral Tab Take 1 tablet by mouth daily. 84 tablet 2    ergocalciferol 1.25 MG (71631 UT) Oral Cap Take 1 capsule (50,000 Units total) by mouth once a week. 12 capsule 3       Allergies:  Allergies[1]    Physical Exam  Constitutional:       Appearance: Normal appearance.   Pulmonary:      Effort: No respiratory distress.   Neurological:      Mental Status: She is alert and oriented to person, place, and time.          Assessment and Plan:  Problem List Items Addressed This Visit    None  Visit Diagnoses       Tachycardia    -  Primary    Relevant Orders    EKG 12 Lead    CARD MONITOR HOLTER 48 HOUR (CPT=93225)    Cardio Referral - Internal           Decrease sertraline to 25 mg daily.  Taper instructions discussed with patient.  Patient to follow-up for EKG as well as Holter monitor study.  Referral to cardiology for assessment and treatment.     Discussed plan of care with patient and patient is in agreement.  All questions answered. Patient to call with questions or concerns.    Encouraged to sign up for My Chart if not already registered.          [1] No Known Allergies

## 2025-01-01 NOTE — ED PROVIDER NOTES
Patient Seen in: [facility]      History     Chief Complaint   Patient presents with    Cough/URI     Stated Complaint: Headache, cough, congested;    Subjective:   HPI    29-year-old female with a history of cough, nasal congestion, intermittent headaches.  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  DayQuil, NyQuil, OTC nasal spray provide temporary relief.        Objective:     History reviewed. No pertinent past medical history.           Past Surgical History:   Procedure Laterality Date    Other surgical history  12/2013    mass removal on RT breast                Social History     Socioeconomic History    Marital status: Single   Tobacco Use    Smoking status: Never    Smokeless tobacco: Never   Vaping Use    Vaping status: Never Used   Substance and Sexual Activity    Alcohol use: Yes     Comment: socially    Drug use: Never    Sexual activity: Yes              Review of Systems    Positive for stated complaint: Headache, cough, congested;  Other systems are as noted in HPI.  Constitutional and vital signs reviewed.      All other systems reviewed and negative except as noted above.    Physical Exam     ED Triage Vitals [01/01/25 0818]   /90   Pulse 97   Resp 19   Temp 98.3 °F (36.8 °C)   Temp src Oral   SpO2 97 %   O2 Device None (Room air)       Current Vitals:   Vital Signs  BP: 130/90  Pulse: 97  Resp: 19  Temp: 98.3 °F (36.8 °C)  Temp src: Oral    Oxygen Therapy  SpO2: 97 %  O2 Device: None (Room air)        Physical Exam  Vitals and nursing note reviewed.   Constitutional:       General: She is not in acute distress.     Appearance: Normal appearance. She is not ill-appearing, toxic-appearing or diaphoretic.   HENT:      Nose: Congestion present.      Mouth/Throat:      Mouth: Mucous membranes are moist.      Pharynx: Oropharynx is clear.   Eyes:      Conjunctiva/sclera: Conjunctivae normal.   Cardiovascular:      Rate and Rhythm: Normal  rate.      Heart sounds: Normal heart sounds.   Pulmonary:      Effort: Pulmonary effort is normal. No respiratory distress.      Breath sounds: Normal breath sounds. No wheezing.   Neurological:      Mental Status: She is alert and oriented to person, place, and time.   Psychiatric:         Behavior: Behavior normal.         ED Course     Labs Reviewed   POCT FLU TEST - Normal    Narrative:     This assay is a rapid molecular in vitro test utilizing nucleic acid amplification of influenza A and B viral RNA.   RAPID SARS-COV-2 BY PCR - Normal          MDM      Differential diagnosis considered but not limited to COVID, influenza, other viral URI, pneumonia    Patient is afebrile and nontoxic in appearance.  Nasal congestion.  Physical exam is reassuring and consistent with viral illness.  COVID and flu negative.  Supportive care discussed. Patient declined Tessalon perles, prefers OTC medications. Work note provided per patient's request.         Medical Decision Making  Amount and/or Complexity of Data Reviewed  Labs: ordered. Decision-making details documented in ED Course.    Risk  OTC drugs.        Disposition and Plan     Clinical Impression:  1. Viral URI         Disposition:  Discharge  1/1/2025  8:44 am    Follow-up:  [facility] [address] [address]  [phone]    If symptoms worsen          Medications Prescribed:  Current Discharge Medication List              Supplementary Documentation:

## 2025-01-02 NOTE — TELEPHONE ENCOUNTER
Refill passed per [facility] protocol.    Last office visit 11/29/2024    RX for # 30     Decrease sertraline to 25 mg daily. Taper instructions discussed with patient. Patient to follow-up for EKG as well as Holter monitor study. Referral to cardiology for assessment and treatment.       Requested Prescriptions   Pending Prescriptions Disp Refills    SERTRALINE 25 MG Oral Tab [Pharmacy Med Name: Sertraline Hydrochloride 25 Mg Tab Nort] 30 tablet 0     Sig: TAKE ONE TABLET BY MOUTH ONE TIME DAILY       Psychiatric Non-Scheduled (Anti-Anxiety) Passed - 1/2/2025  8:23 AM        Passed - In person appointment or virtual visit in the past 6 mos or appointment in next 3 mos     Recent Out[facility]its              [facility]    [facility]    6 months ago [facility] for [facility]ptive [facility]    Office Visit    9 months ago [facility]xiety [facility]    Office Visit    10 months ago Well adult exam    [facility]    Office Visit    1 year ago Well woman exam with routine gynecological exam    [facility], [address], [place] - OB/GYN Rosa Cordoav MD    Office Visit                      Passed - Depression Screening completed within the past 12 months             Recent Outpatient Visits              1 month ago [facility]    [facility]icine    6 months ago [facility] and [facility] for contraceptive management    [facility]    Office Visit    9 months ago Anxiety and depression    [facility],  [place], Eva Velarde APRN    Office Visit    10 months ago Well adult exam    [facility]    Office Visit    1 year ago Well woman exam with routine gynecological exam    [facility] [address], [place] - OB/GYN Rosa Cordova MD    Office Visit

## 2025-04-02 NOTE — TELEPHONE ENCOUNTER
Medication was previosuly sent in with intent for patient to wean off and discontinue.  If she would like to restart, please have patient follow up.  30 day supply sent into pharmacy on file.  Thanks.

## 2025-04-02 NOTE — TELEPHONE ENCOUNTER
Refill passed per [facility] protocol.      Please kindly review , if refills are appropriate to send to pharmacy.    Please see office visit notes from 11/29/2024:     Decrease sertraline to 25 mg daily.  Taper instructions discussed with patient.  Patient to follow-up for EKG as well as Holter monitor study.  Referral to cardiology for assessment and treatment.

## 2025-05-26 NOTE — TELEPHONE ENCOUNTER
Refill passed [facility] protocol.     Please review if refill is appropriate due to message from refill on 3/30/25.    [employer] message sent to patient on 4/2/25, patient read message.    Office visit notes from 11/29/2024:  Decrease sertraline to 25 mg daily.  Taper instructions discussed with patient.  Patient to follow-up for EKG as well as Holter monitor study.  Referral to cardiology for assessment and treatment.

## 2025-07-18 NOTE — PROGRESS NOTES
Catie Perez is a 30 year old female  Patient's last menstrual period was 2025 (exact date).   Chief Complaint   Patient presents with    Annual     Reviewed Preventative/Wellness form with patient.       History of Present Illness  Catie Perez is a 30 year old female who presents for her annual physical exam.    Contraceptive management  - Discontinued birth control last year due to worsening acne  - Restarted birth control and is currently satisfied with the regimen  - Requests a refill of her birth control prescription    Menstrual and sexual health  - Menstrual periods occur monthly  - Sexually active with new partners in the last year  - Declines STD testing at this visit    Cardiac symptoms  - History of supraventricular tachycardia (SVT)  - Consulted a cardiologist for SVT  - No current symptoms of SVT; issue has resolved    General health status  - No changes in health over the past year    OBSTETRICS HISTORY:  OB History    Para Term  AB Living   0 0 0 0 0 0   SAB IAB Ectopic Multiple Live Births   0 0 0 0 0       GYNE HISTORY:  Patient's last menstrual period was 2025 (exact date).    History   Sexual Activity    Sexual activity: Yes    Birth control/ protection: OCP        Pap Date: 22  Pap Result Notes: Neg Pap/HPV  Follow Up Recommendation: Annual 23 JMN      MEDICAL HISTORY:  History reviewed. No pertinent past medical history.      SURGICAL HISTORY:  Past Surgical History:   Procedure Laterality Date    Other surgical history  2013    mass removal on RT breast       SOCIAL HISTORY:  Social History     Socioeconomic History    Marital status: Single     Spouse name: Not on file    Number of children: Not on file    Years of education: Not on file    Highest education level: Not on file   Occupational History    Not on file   Tobacco Use    Smoking status: Never    Smokeless tobacco: Never   Vaping Use    Vaping status: Never Used   Substance  and Sexual Activity    Alcohol use: Yes     Comment: socially    Drug use: Never    Sexual activity: Yes     Birth control/protection: OCP   Other Topics Concern    Not on file   Social History Narrative    Not on file     Social Drivers of Health     Food Insecurity: Not on file   Transportation Needs: Not on file   Stress: Not on file   Housing Stability: Not on file         Depression Screening (PHQ-2/PHQ-9): Over the LAST 2 WEEKS   Little interest or pleasure in doing things (over the last two weeks)?: Not at all  Little interest or pleasure in doing things: Not at all    Feeling down, depressed, or hopeless (over the last two weeks)?: Not at all  Feeling down, depressed, or hopeless: Not at all    PHQ-2 SCORE: 0  PHQ-2 SCORE: 0           MEDICATIONS:    Current Outpatient Medications:     Multiple Vitamins-Minerals (HAIR SKIN AND NAILS FORMULA OR), , Disp: , Rfl:     Norgestim-Eth Estrad Triphasic (TRI-LINYAH) 0.18/0.215/0.25 MG-35 MCG Oral Tab, Take 1 tablet by mouth daily., Disp: 84 tablet, Rfl: 4    sertraline 25 MG Oral Tab, Take 1 tablet (25 mg total) by mouth daily. Follow up appt required for further refills., Disp: 30 tablet, Rfl: 0    ergocalciferol 1.25 MG (53279 UT) Oral Cap, Take 1 capsule (50,000 Units total) by mouth once a week., Disp: 12 capsule, Rfl: 3    ALLERGIES:  No Known Allergies      Review of Systems:  Review of Systems   All other systems reviewed and are negative.       Vitals:    07/18/25 1108   BP: 115/75   Pulse: 60       PHYSICAL EXAM:   Physical Exam  Vitals reviewed.   Constitutional:       Appearance: Normal appearance.   HENT:      Head: Atraumatic.   Eyes:      Pupils: Pupils are equal, round, and reactive to light.   Pulmonary:      Effort: Pulmonary effort is normal.   Chest:   Breasts:     Right: Normal. No bleeding, inverted nipple, mass, nipple discharge, skin change or tenderness.      Left: Normal. No bleeding, inverted nipple, mass, nipple discharge, skin change or  tenderness.   Abdominal:      General: Abdomen is flat.      Palpations: Abdomen is soft.      Tenderness: There is no abdominal tenderness.   Genitourinary:     General: Normal vulva.      Exam position: Lithotomy position.      Labia:         Right: No rash, tenderness, lesion or injury.         Left: No rash, tenderness, lesion or injury.       Vagina: Normal.      Cervix: Normal.      Uterus: Normal. Not tender.       Adnexa: Right adnexa normal and left adnexa normal.        Right: No tenderness or fullness.          Left: No tenderness or fullness.     Lymphadenopathy:      Upper Body:      Right upper body: No supraclavicular, axillary or pectoral adenopathy.      Left upper body: No supraclavicular, axillary or pectoral adenopathy.   Skin:     General: Skin is warm and dry.   Neurological:      General: No focal deficit present.      Mental Status: She is alert and oriented to person, place, and time.   Psychiatric:         Mood and Affect: Mood normal.         Behavior: Behavior normal.         Thought Content: Thought content normal.         Judgment: Judgment normal.           Assessment & Plan:  Catie was seen today for annual.    Diagnoses and all orders for this visit:    Well woman exam with routine gynecological exam  -     ThinPrep PAP with HPV Reflex Request B; Future  -     ThinPrep PAP with HPV Reflex Request B    General counseling and advice for contraceptive management  -     Norgestim-Eth Estrad Triphasic (TRI-LINYAH) 0.18/0.215/0.25 MG-35 MCG Oral Tab; Take 1 tablet by mouth daily.        Assessment & Plan  Well Woman Exam  Routine annual exam. Sexually active with new partners, declined STD testing.   - Perform Pap smear.    Contraceptive Management  Using Norgestim-eth estrad triphasic (Tri-Linyah), satisfied with method.    Recording duration: 5 minutes    Requested Prescriptions     Signed Prescriptions Disp Refills    Norgestim-Eth Estrad Triphasic (TRI-LINYAH) 0.18/0.215/0.25 MG-35  MCG Oral Tab 84 tablet 4     Sig: Take 1 tablet by mouth daily.

## 2025-07-18 NOTE — PROGRESS NOTES
The following individual(s) verbally consented to be recorded using ambient AI listening technology and understand that they can each withdraw their consent to this listening technology at any point by asking the clinician to turn off or pause the recording:    Patient name: Catie MCGREGOR Ana  Additional names:       Maternal/Fetal Alert  mono di twins   twin B IUGR  delivery planning 32-34 weeks   ALERT NICU   Valentine Combs RN 8/22/2022

## (undated) NOTE — LETTER
Date & Time: 1/1/2025, 8:44 AM  Patient: Catie Perez  Encounter Provider(s):    Jade Avery PA-C       To Whom It May Concern:    Catie Perez was seen and treated in our department on 1/1/2025. She should not return to work until 1/3/2025  as long as fever-free for 24 hours prior to return without needing to take fever-reducing medications .    If you have any questions or concerns, please do not hesitate to call.        _____________________________  Physician/APC Signature